# Patient Record
Sex: MALE | Race: WHITE | URBAN - METROPOLITAN AREA
[De-identification: names, ages, dates, MRNs, and addresses within clinical notes are randomized per-mention and may not be internally consistent; named-entity substitution may affect disease eponyms.]

---

## 2019-11-23 ENCOUNTER — EMERGENCY (EMERGENCY)
Facility: HOSPITAL | Age: 39
LOS: 0 days | Discharge: HOME | End: 2019-11-23
Attending: EMERGENCY MEDICINE
Payer: MEDICARE

## 2019-11-23 VITALS
HEIGHT: 66 IN | TEMPERATURE: 98 F | DIASTOLIC BLOOD PRESSURE: 83 MMHG | OXYGEN SATURATION: 93 % | SYSTOLIC BLOOD PRESSURE: 143 MMHG | HEART RATE: 103 BPM | WEIGHT: 179.9 LBS | RESPIRATION RATE: 18 BRPM

## 2019-11-23 DIAGNOSIS — S61.411A LACERATION WITHOUT FOREIGN BODY OF RIGHT HAND, INITIAL ENCOUNTER: ICD-10-CM
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
## 2019-11-23 PROCEDURE — 90715 TDAP VACCINE 7 YRS/> IM: CPT

## 2019-11-23 PROCEDURE — 99284 EMERGENCY DEPT VISIT MOD MDM: CPT

## 2019-11-23 PROCEDURE — 99284 EMERGENCY DEPT VISIT MOD MDM: CPT | Mod: 25

## 2019-11-23 PROCEDURE — 12001 RPR S/N/AX/GEN/TRNK 2.5CM/<: CPT | Mod: RT

## 2019-11-23 RX ORDER — TETANUS TOXOID, REDUCED DIPHTHERIA TOXOID AND ACELLULAR PERTUSSIS VACCINE, ADSORBED 5; 2.5; 8; 8; 2.5 [IU]/.5ML; [IU]/.5ML; UG/.5ML; UG/.5ML; UG/.5ML
0.5 SUSPENSION INTRAMUSCULAR ONCE
Refills: 0 | Status: COMPLETED | OUTPATIENT
Start: 2019-11-23 | End: 2019-11-23

## 2019-11-23 RX ORDER — CEPHALEXIN 500 MG
500 CAPSULE ORAL
Refills: 0 | Status: DISCONTINUED | OUTPATIENT
Start: 2019-11-23 | End: 2019-11-23

## 2019-11-23 RX ADMIN — TETANUS TOXOID, REDUCED DIPHTHERIA TOXOID AND ACELLULAR PERTUSSIS VACCINE, ADSORBED 0.5 MILLILITER(S): 5; 2.5; 8; 8; 2.5 SUSPENSION INTRAMUSCULAR at 21:01

## 2019-11-23 RX ADMIN — Medication 500 MILLIGRAM(S): at 22:39

## 2019-11-23 NOTE — ED STATDOCS - OBJECTIVE STATEMENT
40 y/o male with PMHx of presents to the ED c/o R 5th digit laceration with assoc. pain. Pt was washing the dishes, when a glass shattered in his hand. No other acute complaints in ED at this time. Tetanus not UTD.

## 2019-11-23 NOTE — CONSULT NOTE ADULT - ASSESSMENT
39M with R 5th digit laceration  Pain control  Advise to limit use of R hand  Keep dressing clean/dry/intact  Recommend course of keflex for infection prophylaxis  Follow up with Dr. Richard in the office within one week, call office for appointment  Discussed with patient he will need suture removal as outpatient in the office  Discussed with Dr. Jose Manuel mcadams for d/c

## 2019-11-23 NOTE — ED STATDOCS - CARE PROVIDER_API CALL
Madan Richard)  Orthopaedic Surgery; Surgery of the Hand  [address] [place] [address]  Phone: [phone]  Fax: [phone]  Follow Up Time:

## 2019-11-23 NOTE — ED STATDOCS - CLINICAL SUMMARY MEDICAL DECISION MAKING FREE TEXT BOX
TDAP and lac repair planned with outpatient wound check. TDAP and lac repair planned with outpatient wound check.      Unable to send Rx to [facility], paper RX provided.  Mimi Lopez PA-C

## 2019-11-23 NOTE — CONSULT NOTE ADULT - SUBJECTIVE AND OBJECTIVE BOX
39M presents today c/o laceration to R hand. Pt states he was washing dishes when a glass shattered in his R hand. He immediately noticed a cut to the outside of his R hand and came to ED for further evaluation. Denies numbness/tingling. Denies other injuries. No previous orthopedic history. Pt is RHD.    CONSTITUTIONAL: No fever or chills  HEENT:  No headache, no sore throat  RESPIRATORY: No cough, wheezing, or shortness of breath  CARDIOVASCULAR: No chest pain, palpitations, or leg swelling  GASTROINTESTINAL: No nausea, vomiting, or diarrhea  GENITOURINARY: No dysuria, frequency, or hematuria  NEUROLOGICAL: no focal weakness or dizziness  SKIN:  No rashes or lesions   MUSCULOSKELETAL: no myalgias   PSYCHIATRIC: No depression or anxiety    PAST MEDICAL & SURGICAL HISTORY:  Denies    Allergies    No Known Allergies    Intolerances    Vital Signs Last 24 Hrs  T(C): 36.4 (23 Nov 2019 19:59), Max: 36.4 (23 Nov 2019 19:59)  T(F): 97.6 (23 Nov 2019 19:59), Max: 97.6 (23 Nov 2019 19:59)  HR: 103 (23 Nov 2019 19:59) (103 - 103)  BP: 143/83 (23 Nov 2019 19:59) (143/83 - 143/83)  BP(mean): --  RR: 18 (23 Nov 2019 19:59) (18 - 18)  SpO2: 93% (23 Nov 2019 19:59) (93% - 93%)    PE  Gen: NAD, AAOx3  R Hand: Approx 2 cm laceration to ulnar aspect over 5th digit proximal phalanx just distal to MCP joint, unable to visualize MCP joint, no obvious foreign bodies, full ROM of pinky and other fingers, able to extend pinky with 5/5 strength, SILT all 5 fingers, cap refill brisk    Procedure  Risks/benefits of laceration repair discussed with patient. Patient gave verbal consent to laceration repair in ED. Using aseptic technique, a 22g needle was used to numb the area surrounding the wound with 1% lidocaine without epinephrine. The finger was draped in the usual sterile fashion. The wound was copiously irrigated with betadyne and sterile saline. 5-0 nylon sutures were used to approximate the skin edges. The wound was dressed with xeroform, sterile 4x4s, cling wrap and ACE. Patient tolerated the procedure well. Post procedure exam unchanged.

## 2019-11-23 NOTE — ED STATDOCS - PATIENT PORTAL LINK FT
You can access the FollowMyHealth Patient Portal offered by [facility] by registering at the following website: [email or web address] By joining [email or web address] FollowMyHealth portal, you will also be able to view your health information using other applications (apps) compatible with our system.

## 2019-11-23 NOTE — ED STATDOCS - ATTENDING CONTRIBUTION TO CARE
Attending Contribution to Care: I, Karen Stanford, performed the initial face to face bedside interview with this patient regarding history of present illness, review of symptoms and relevant past medical, social and family history.  I completed an independent physical examination.  I was the initial provider who evaluated this patient. I have signed out the follow up of any pending tests (i.e. labs, radiological studies) to the ACP.  I have communicated the patient’s plan of care and disposition with the ACP.

## 2019-11-23 NOTE — ED STATDOCS - SKIN, MLM
2cm superficial laceration over the 5th MCP to dorsum of R hand. no active bleeding. normal distal sensation, ROM of hands, fingers.

## 2019-11-23 NOTE — ED STATDOCS - PROGRESS NOTE DETAILS
Pt. presents with laceration right hand over fifth MCP s/p cut with a glass while washing it.  Right hand dominant.  Mild pain.  FROM of fifth finger.  Exploration shows deep laceration with ?exposed periosteum.  Will consult hand.  Mimi Lopez PA-C I spoke with Dr. Richard and he requests I call orthopedic resident.  Mimi Lopez PA-C Pt. presents with laceration right hand over fifth MCP s/p cut with a glass while washing it.  Right hand dominant.  Mild pain.  FROM of fifth finger.  No visible foreign body.   Exploration shows deep laceration with ?exposed periosteum.  Will consult hand.  Mimi Lopez PA-C Orthopedic resident repaired laceration and provided aftercare instructions.  Mimi Lopez PA-C Unable to send Rx to [facility], paper RX provided.  Mimi Lopez PA-C

## 2019-11-23 NOTE — ED ADULT NURSE NOTE - OBJECTIVE STATEMENT
Pt presents to ED c/o right hand pinky finger laceration. Pt reports he was washing glass when glass broke in hand. Denies pain at present. Unknown last tetanus, NKDA.

## 2019-11-24 RX ORDER — CEPHALEXIN 500 MG
1 CAPSULE ORAL
Qty: 40 | Refills: 0
Start: 2019-11-24 | End: 2019-12-03

## 2020-10-09 NOTE — ED STATDOCS - NS ED ATTENDING STATEMENT MOD
69
I have personally performed a face to face diagnostic evaluation on this patient. I have reviewed the ACP note and agree with the history, exam and plan of care, except as noted.